# Patient Record
Sex: MALE | Race: WHITE | Employment: OTHER | URBAN - METROPOLITAN AREA
[De-identification: names, ages, dates, MRNs, and addresses within clinical notes are randomized per-mention and may not be internally consistent; named-entity substitution may affect disease eponyms.]

---

## 2019-08-20 NOTE — PROGRESS NOTES
Michele Pearson is a 71 y.o. male here for   Chief Complaint   Patient presents with    New Patient     referred by Dr. Abelino Dexter for DM       Functional glucose monitor and record keeping system? - not often  Eye exam within last year? - due  Foot exam within last year? - due    1. Have you been to the ER, urgent care clinic since your last visit? Hospitalized since your last visit? -n/a    2. Have you seen or consulted any other health care providers outside of the [address] [place] [address] since your last visit?   Include any pap smears or colon screening.-n/a

## 2019-08-21 ENCOUNTER — OFFICE VISIT (OUTPATIENT)
Dept: ENDOCRINOLOGY | Age: 64
End: 2019-08-21

## 2019-08-21 VITALS
HEART RATE: 68 BPM | WEIGHT: 192 LBS | RESPIRATION RATE: 18 BRPM | BODY MASS INDEX: 26.88 KG/M2 | SYSTOLIC BLOOD PRESSURE: 127 MMHG | HEIGHT: 71 IN | DIASTOLIC BLOOD PRESSURE: 73 MMHG | OXYGEN SATURATION: 98 %

## 2019-08-21 DIAGNOSIS — G62.9 POLYNEUROPATHY, UNSPECIFIED: ICD-10-CM

## 2019-08-21 DIAGNOSIS — Z79.4 TYPE 2 DIABETES MELLITUS WITH HYPERGLYCEMIA, WITH LONG-TERM CURRENT USE OF INSULIN (HCC): Primary | ICD-10-CM

## 2019-08-21 DIAGNOSIS — E78.2 MIXED HYPERLIPIDEMIA: ICD-10-CM

## 2019-08-21 DIAGNOSIS — I10 ESSENTIAL HYPERTENSION: ICD-10-CM

## 2019-08-21 DIAGNOSIS — E11.65 TYPE 2 DIABETES MELLITUS WITH HYPERGLYCEMIA, WITH LONG-TERM CURRENT USE OF INSULIN (HCC): Primary | ICD-10-CM

## 2019-08-21 DIAGNOSIS — E11.65 TYPE 2 DIABETES MELLITUS WITH HYPERGLYCEMIA, UNSPECIFIED WHETHER LONG TERM INSULIN USE (HCC): Primary | ICD-10-CM

## 2019-08-21 RX ORDER — SIMVASTATIN 80 MG/1
80 TABLET, FILM COATED ORAL DAILY
COMMUNITY
Start: 2011-04-25

## 2019-08-21 RX ORDER — OXYCODONE HYDROCHLORIDE 5 MG/1
TABLET ORAL AS NEEDED
COMMUNITY
Start: 2019-08-08

## 2019-08-21 RX ORDER — ASPIRIN 325 MG
325 TABLET ORAL DAILY
COMMUNITY

## 2019-08-21 RX ORDER — LISINOPRIL 10 MG/1
5 TABLET ORAL
COMMUNITY

## 2019-08-21 RX ORDER — GLIMEPIRIDE 4 MG/1
4 TABLET ORAL
COMMUNITY
End: 2019-08-21

## 2019-08-21 RX ORDER — METFORMIN HYDROCHLORIDE 1000 MG/1
1000 TABLET ORAL 2 TIMES DAILY WITH MEALS
Qty: 60 TAB | Refills: 11 | Status: SHIPPED | OUTPATIENT
Start: 2019-08-21

## 2019-08-21 RX ORDER — INSULIN GLARGINE 100 [IU]/ML
50 INJECTION, SOLUTION SUBCUTANEOUS DAILY
COMMUNITY
Start: 2019-06-28 | End: 2020-01-29 | Stop reason: ALTCHOICE

## 2019-08-21 RX ORDER — INSULIN ASPART 100 [IU]/ML
8 INJECTION, SOLUTION INTRAVENOUS; SUBCUTANEOUS
COMMUNITY
Start: 2019-07-26 | End: 2019-08-21 | Stop reason: ALTCHOICE

## 2019-08-21 NOTE — LETTER
8/24/19 Patient: Karlie Mojica YOB: 1955 Date of Visit: 8/21/2019 Simba Lakhani MD 
[address] [facility] [address] [place] [address] VIA In Basket Dear Simba Lakhani MD, Thank you for referring Mr. Wing Marmolejo to [address] [address] for evaluation. My notes for this consultation are attached. If you have questions, please do not hesitate to call me. I look forward to following your patient along with you. Sincerely, Abran Welsh MD

## 2019-08-21 NOTE — PATIENT INSTRUCTIONS
Start Metformin 1000 mg twice a day - this will take the place of Jentadueto    Continue Lantus 50 units at night     Start Trulicity weekly , may cause nausea     Stop Novolog , glimepiride       Check sugars twice daily - fist before breakfast and second time at different times ( before dinner , bedtime)

## 2019-08-21 NOTE — PROGRESS NOTES
[facility]               Lynette Molina MD        [address] 78 [phone] Fax [phone]               Patient Information  Date:8/24/2019  Name : Javier Potts 71 y.o.     YOB: 1955         Referred by: Mert Zhou MD         Chief Complaint   Patient presents with   [facility] New Patient     referred by Dr. Lenka Moreno for DM       History of Present Illness: Javier Potts is a 71 y.o. male here for initial visit of  Type 2 Diabetes Mellitus. Type 2 Diabetes was diagnosed 2008 . End organ effects of diabetes: peripheral neuropathy. He is not checking the blood glucose. A1c was 13 in June 2019, has made some changes, blood glucose is still in 200s    Cardiovascular risk factors: dyslipidemia, diabetes mellitus   No hypoglycemia. Very limited activity due to arthritis. Diet is high in carbohydrates. Weight has been fluctuating. No chest pain or shortness of breath, blurred vision  He is on Lantus, NovoLog, glimepiride, Jentadueto. Eats 3 meals a day, on soft drinks        Wt Readings from Last 3 Encounters:   08/21/19 192 lb (87.1 kg)       BP Readings from Last 3 Encounters:   08/21/19 127/73           Past Medical History:   Diagnosis Date    CAD (coronary artery disease) 4/14/2010    Depression 4/14/2010    History of elevated lipids 4/14/2010    HTN (hypertension) 4/14/2010    Nerve damage     R hand    Type 2 diabetes mellitus (HCC)      Current Outpatient Medications   Medication Sig    lisinopril (PRINIVIL, ZESTRIL) 10 mg tablet Take 5 mg by mouth.  oxyCODONE IR (ROXICODONE) 5 mg immediate release tablet as needed.  simvastatin (ZOCOR) 80 mg tablet Take 80 mg by mouth daily.  LANTUS SOLOSTAR U-100 INSULIN 100 unit/mL (3 mL) inpn 50 Units by SubCUTAneous route daily.  aspirin (ASPIRIN) 325 mg tablet Take 325 mg by mouth daily.     dulaglutide (TRULICITY) 7.24 JY/7.4 mL sub-q pen 0.5 mL by SubCUTAneous route every seven (7) days.  metFORMIN (GLUCOPHAGE) 1,000 mg tablet Take 1 Tab by mouth two (2) times daily (with meals). No current facility-administered medications for this visit. No Known Allergies    Review of Systems:  All 10 systems reviewed and are negative other than mentioned in HPI    Physical Examination:   Blood pressure 127/73, pulse 68, resp. rate 18, height 5' 11\" (1.803 m), weight 192 lb (87.1 kg), SpO2 98 %. Estimated body mass index is 26.78 kg/m² as calculated from the following:    Height as of this encounter: 5' 11\" (1.803 m). -   Weight as of this encounter: 192 lb (87.1 kg).   - General: pleasant, no distress, good eye contact  - HEENT: no pallor, no periorbital edema, EOMI  - Neck: supple, no thyromegaly, no nodules  - Cardiovascular: regular,  normal S1 and S2,  - Respiratory: clear to auscultation bilaterally  - Gastrointestinal: soft, nontender, nondistended,  BS +  - Musculoskeletal: no proximal muscle weakness in upper or lower extremities  - Integumentary:,no edema,   - Neurological: alert and oriented  - Psychiatric: normal mood and affect  - Skin: color, texture, turgor normal.     Diabetic foot exam:     Diabetic foot exam:     Left:     Vibratory sensation absent   Filament test decreased sensation with micro filament   Pulse DP: 1+    Deformities: None  Right:    Vibratory sensation absent   Filament test decreased sensation with micro filament   Pulse DP: 1+   Deformities: None      Data Reviewed:       Lab Results   Component Value Date/Time    Glucose 112 (H) 02/15/2009 02:15 PM    Creatinine 1.2 02/15/2009 02:15 PM      Lab Results   Component Value Date/Time    GFR est non-AA >60 02/15/2009 02:15 PM    GFR est AA >60 02/15/2009 02:15 PM    Creatinine 1.2 02/15/2009 02:15 PM    BUN 18 02/15/2009 02:15 PM    Sodium 138 02/15/2009 02:15 PM    Potassium 4.1 02/15/2009 02:15 PM    Chloride 99 02/15/2009 02:15 PM    CO2 25 02/15/2009 02:15 PM       Assessment/Plan: 1. Type 2 diabetes mellitus with hyperglycemia, with long-term current use of insulin ([address]    2. Essential hypertension    3. Mixed hyperlipidemia    4. Polyneuropathy, unspecified        1. Type 2 Diabetes Mellitus with neuropathy  No results found for: HBA1C, HGBE8, UNT1USEE, OAF3ILNJ, HNI3CZQF  Discussed the pathophysiology, no signs of insulin resistance clinically  Stressed the importance of low-carb diet, to increase activity  Has cravings for soft drinks  Trulicity, discontinue Jentadueto, discontinue glimepiride, discontinue NovoLog  Continue Lantus  Metformin  Discussed the benefits and side effects of different medications  Advised to check glucose 2 - 4 times daily    Diabetic issues reviewed : glycemic goals , written exchange diet given, low carbohydrate diet, weight control , home glucose monitoring emphasized,  hypoglycemia management and long term diabetic complications discussed. FLU annually ,Pneumovax ,aspirin daily,annual eye exam,microalbumin    2. HTN : Continue current therapy     3. Hyperlipidemia : Continue statin. 4.peripheral neuropathy: Stressed the importance of good glycemic control  Foot care    Risk for hypoglycemia is high, need close monitoring, importance of checking the blood glucose,        Patient Instructions   Start Metformin 1000 mg twice a day - this will take the place of Jentadueto    Continue Lantus 50 units at night     Start Trulicity weekly , may cause nausea     Stop Novolog , glimepiride       Check sugars twice daily - fist before breakfast and second time at different times ( before dinner , bedtime)         Follow-up and Dispositions    · Return in about 3 months (around 11/21/2019). Thank you for allowing me to participate in the care of this patient.     Denise Chase MD      Patient verbalized understanding     Voice-recognition software was used to generate this report, which may result in some phonetic-based errors in the grammar and contents. Even though attempts were made to correct all the mistakes, some may have been missed and remained in the body of the report.

## 2019-08-24 PROBLEM — G62.9 POLYNEUROPATHY, UNSPECIFIED: Status: ACTIVE | Noted: 2019-08-24

## 2019-08-24 PROBLEM — E78.2 MIXED HYPERLIPIDEMIA: Status: ACTIVE | Noted: 2019-08-24

## 2019-10-15 LAB
CREATININE, EXTERNAL: 1.11
LDL-C, EXTERNAL: 74
MICROALBUMIN UR TEST STR-MCNC: 33.9 MG/DL

## 2020-01-28 ENCOUNTER — OFFICE VISIT (OUTPATIENT)
Dept: ENDOCRINOLOGY | Age: 65
End: 2020-01-28

## 2020-01-28 VITALS
HEART RATE: 77 BPM | BODY MASS INDEX: 26.6 KG/M2 | TEMPERATURE: 96 F | HEIGHT: 71 IN | SYSTOLIC BLOOD PRESSURE: 123 MMHG | RESPIRATION RATE: 16 BRPM | WEIGHT: 190 LBS | OXYGEN SATURATION: 97 % | DIASTOLIC BLOOD PRESSURE: 67 MMHG

## 2020-01-28 DIAGNOSIS — I10 ESSENTIAL HYPERTENSION: ICD-10-CM

## 2020-01-28 DIAGNOSIS — E11.65 TYPE 2 DIABETES MELLITUS WITH HYPERGLYCEMIA, WITH LONG-TERM CURRENT USE OF INSULIN (HCC): Primary | ICD-10-CM

## 2020-01-28 DIAGNOSIS — E78.2 MIXED HYPERLIPIDEMIA: ICD-10-CM

## 2020-01-28 DIAGNOSIS — Z79.4 TYPE 2 DIABETES MELLITUS WITH HYPERGLYCEMIA, WITH LONG-TERM CURRENT USE OF INSULIN (HCC): Primary | ICD-10-CM

## 2020-01-28 RX ORDER — INSULIN ASPART 100 [IU]/ML
INJECTION, SOLUTION INTRAVENOUS; SUBCUTANEOUS
COMMUNITY

## 2020-01-28 NOTE — PROGRESS NOTES
Carly Lira MD      Patient Information  Date:1/28/2020  Name : Sheila Leroy 71 y.o.     YOB: 1955         Referred by: Rosendo Munoz MD         Chief Complaint   Patient presents with    Diabetes       History of Present Illness: Sheila Leroy is a 71 y.o. male here for follow-up of  Type 2 Diabetes Mellitus. This is a second visit  Type 2 Diabetes was diagnosed 2008 . End organ effects of diabetes: peripheral neuropathy. No change in the A1c it is still 13, not checking the blood glucose  Initially reported to be taking  insulin consistently later on wife admitted due to the expense of insulin he is taking as needed, self decrease the dose  Diet is high in carbohydrates  Weight has been fluctuating  Trulicity helped but was expensive    Eats 3 meals a day, on soft drinks        Wt Readings from Last 3 Encounters:   01/28/20 190 lb (86.2 kg)   08/21/19 192 lb (87.1 kg)       BP Readings from Last 3 Encounters:   01/28/20 123/67   08/21/19 127/73           Past Medical History:   Diagnosis Date    CAD (coronary artery disease) 4/14/2010    Depression 4/14/2010    History of elevated lipids 4/14/2010    HTN (hypertension) 4/14/2010    Nerve damage     R hand    Type 2 diabetes mellitus (HCC)      Current Outpatient Medications   Medication Sig    insulin aspart U-100 (NOVOLOG FLEXPEN U-100 INSULIN) 100 unit/mL (3 mL) inpn by SubCUTAneous route daily (after dinner).  lisinopril (PRINIVIL, ZESTRIL) 10 mg tablet Take 5 mg by mouth.  oxyCODONE IR (ROXICODONE) 5 mg immediate release tablet as needed.  simvastatin (ZOCOR) 80 mg tablet Take 80 mg by mouth daily.  LANTUS SOLOSTAR U-100 INSULIN 100 unit/mL (3 mL) inpn 50 Units by SubCUTAneous route daily.  aspirin (ASPIRIN) 325 mg tablet Take 325 mg by mouth daily.  metFORMIN (GLUCOPHAGE) 1,000 mg tablet Take 1 Tab by mouth two (2) times daily (with meals).     dulaglutide (TRULICITY) 2.90 WO/0.7 mL sub-q pen 0.5 mL by SubCUTAneous route every seven (7) days. No current facility-administered medications for this visit. No Known Allergies    Review of Systems:  All 10 systems reviewed and are negative other than mentioned in HPI    Physical Examination:   Blood pressure 123/67, pulse 77, temperature 96 °F (35.6 °C), temperature source Oral, resp. rate 16, height 5' 11\" (1.803 m), weight 190 lb (86.2 kg), SpO2 97 %. Estimated body mass index is 26.5 kg/m² as calculated from the following:    Height as of this encounter: 5' 11\" (1.803 m). -   Weight as of this encounter: 190 lb (86.2 kg).   - General: pleasant, no distress, good eye contact  - HEENT: no pallor, no periorbital edema, EOMI  - Neck: supple  - Cardiovascular: regular,  normal S1 and S2,  - Respiratory: clear to auscultation bilaterally  - Gastrointestinal: soft, nontender, nondistended,  BS +  - Musculoskeletal: no proximal muscle weakness in upper or lower extremities  - Integumentary:,no edema,   - Neurological: alert and oriented  - Psychiatric: normal mood and affect  - Skin: color, texture, turgor normal.     Diabetic foot exam:     Diabetic foot exam:     Left:     Vibratory sensation absent   Filament test decreased sensation with micro filament   Pulse DP: 1+    Deformities: None  Right:    Vibratory sensation absent   Filament test decreased sensation with micro filament   Pulse DP: 1+   Deformities: None      Data Reviewed:       Lab Results   Component Value Date/Time    Glucose 112 (H) 02/15/2009 02:15 PM    Creatinine 1.2 02/15/2009 02:15 PM      Lab Results   Component Value Date/Time    GFR est non-AA >60 02/15/2009 02:15 PM    GFR est AA >60 02/15/2009 02:15 PM    Creatinine 1.2 02/15/2009 02:15 PM    BUN 18 02/15/2009 02:15 PM    Sodium 138 02/15/2009 02:15 PM    Potassium 4.1 02/15/2009 02:15 PM    Chloride 99 02/15/2009 02:15 PM    CO2 25 02/15/2009 02:15 PM       Assessment/Plan: 1. Type 2 diabetes mellitus with hyperglycemia, with long-term current use of insulin ([address]    2. Mixed hyperlipidemia        1. Type 2 Diabetes Mellitus with neuropathy  No results found for: HBA1C, HGBE8, HIZ9HBTJ, ZDC8LQMJ, KQG2NHUZ    Stressed the importance of low-carb diet, to increase activity  He still eating ice cream every night and drinking soft drinks  NPH 30 units in the morning, 20 units at bedtime  Continue metformin  Log in 2 weeks  Cut down carbohydrates    Diabetic issues reviewed : glycemic goals , written exchange diet given, low carbohydrate diet, weight control , home glucose monitoring emphasized,  hypoglycemia management and long term diabetic complications discussed. FLU annually ,Pneumovax ,aspirin daily,annual eye exam,microalbumin    2. HTN : Continue current therapy     3. Hyperlipidemia : Continue statin. 4.peripheral neuropathy: Stressed the importance of good glycemic control  Foot care          There are no Patient Instructions on file for this visit. Thank you for allowing me to participate in the care of this patient. Arline Byrd MD      Patient verbalized understanding     Voice-recognition software was used to generate this report, which may result in some phonetic-based errors in the grammar and contents. Even though attempts were made to correct all the mistakes, some may have been missed and remained in the body of the report.

## 2020-01-28 NOTE — LETTER
1/30/20 Patient: Huey Merritt YOB: 1955 Date of Visit: 1/28/2020 Joselito Gomez MD 
[address] [address] VIA Facsimile: [phone] Dear Joselito Gomez MD, Thank you for referring Mr. Abhay Chu to [address] [place] [address] for evaluation. My notes for this consultation are attached. If you have questions, please do not hesitate to call me. I look forward to following your patient along with you. Sincerely, Marj Kennedy MD

## 2020-01-28 NOTE — PROGRESS NOTES
Ranjit Marroquin is a 71 y.o. male here for   Chief Complaint   Patient presents with    Diabetes       Functional glucose monitor and record keeping system? -yes   Eye exam within last year? -a month ago Dr. Reilly Carlisle exam within last year? - on file    1. Have you been to the ER, urgent care clinic since your last visit? Hospitalized since your last visit? -no    2. Have you seen or consulted any other health care providers outside of the [address] [place] [address] since your last visit? Include any pap smears or colon screening. -PCP

## 2020-01-28 NOTE — PATIENT INSTRUCTIONS
Metformin 1000 mg twice a day - Novolin N 30 units in AM and 20 units at bedtime We will decide about Novolog after 2 weeks depending on the sugar reading you send Check sugars twice daily - fist before breakfast and second time at different times ( before dinner , bedtime)

## 2020-01-29 RX ORDER — SYRINGE AND NEEDLE,INSULIN,1ML 31GX15/64"
1 SYRINGE, EMPTY DISPOSABLE MISCELLANEOUS 2 TIMES DAILY
Qty: 200 PEN NEEDLE | Refills: 3 | Status: SHIPPED | OUTPATIENT
Start: 2020-01-29 | End: 2021-02-24

## 2020-10-28 ENCOUNTER — OFFICE VISIT (OUTPATIENT)
Dept: ENDOCRINOLOGY | Age: 65
End: 2020-10-28
Payer: COMMERCIAL

## 2020-10-28 VITALS
WEIGHT: 199 LBS | HEART RATE: 75 BPM | OXYGEN SATURATION: 97 % | HEIGHT: 71 IN | RESPIRATION RATE: 22 BRPM | TEMPERATURE: 95.4 F | SYSTOLIC BLOOD PRESSURE: 133 MMHG | BODY MASS INDEX: 27.86 KG/M2 | DIASTOLIC BLOOD PRESSURE: 91 MMHG

## 2020-10-28 DIAGNOSIS — Z79.4 TYPE 2 DIABETES MELLITUS WITH HYPERGLYCEMIA, WITH LONG-TERM CURRENT USE OF INSULIN (HCC): Primary | ICD-10-CM

## 2020-10-28 DIAGNOSIS — E11.65 TYPE 2 DIABETES MELLITUS WITH HYPERGLYCEMIA, WITH LONG-TERM CURRENT USE OF INSULIN (HCC): Primary | ICD-10-CM

## 2020-10-28 DIAGNOSIS — G62.9 POLYNEUROPATHY, UNSPECIFIED: ICD-10-CM

## 2020-10-28 DIAGNOSIS — E11.65 TYPE 2 DIABETES MELLITUS WITH HYPERGLYCEMIA, WITH LONG-TERM CURRENT USE OF INSULIN (HCC): ICD-10-CM

## 2020-10-28 DIAGNOSIS — Z79.4 TYPE 2 DIABETES MELLITUS WITH HYPERGLYCEMIA, WITH LONG-TERM CURRENT USE OF INSULIN (HCC): ICD-10-CM

## 2020-10-28 DIAGNOSIS — E78.2 MIXED HYPERLIPIDEMIA: ICD-10-CM

## 2020-10-28 DIAGNOSIS — I10 ESSENTIAL HYPERTENSION: ICD-10-CM

## 2020-10-28 PROCEDURE — 83036 HEMOGLOBIN GLYCOSYLATED A1C: CPT | Performed by: INTERNAL MEDICINE

## 2020-10-28 PROCEDURE — 99215 OFFICE O/P EST HI 40 MIN: CPT | Performed by: INTERNAL MEDICINE

## 2020-10-28 NOTE — PROGRESS NOTES
Honorio Sotelo is a 76 y.o. male here for   Chief Complaint   Patient presents with    Diabetes       1. Have you been to the ER, urgent care clinic since your last visit? Hospitalized since your last visit? -no    2. Have you seen or consulted any other health care providers outside of the [address] [place] [address] since your last visit? Include any pap smears or colon screening. -PCP

## 2020-10-28 NOTE — PROGRESS NOTES
Dasha Andres MD      Patient Information  Date:10/28/2020  Name : Nimo Sher 76 y.o.     YOB: 1955         Referred by: Micki Montalvo MD         Chief Complaint   Patient presents with    Diabetes       History of Present Illness: Nimo Sher is a 76 y.o. male here for follow-up of  Type 2 Diabetes Mellitus. He was seen once in 2019, then beginning of 2020, this is his third visit  Type 2 Diabetes was diagnosed 2008 . End organ effects of diabetes: peripheral neuropathy. Last A1c elevated  Did not bring the meter  In the past due to the expense of the insulin he was taking insulin as needed  He was on Novolin N 30 units in the morning, 20 units p.m last visit  PCP increased it to 35 units in the morning and 25 units p.m.   Reports that the blood glucose has improved and they are less than 130 in the morning, evening numbers are also better per recall  No log to confirm  However A1c is still high  Limited activity due to cramping in his legs  Chronic smoker  No arthritis  Diet is high in carbohydrates    Trulicity helped but was expensive    Eats 3 meals a day, on soft drinks        Wt Readings from Last 3 Encounters:   10/28/20 199 lb (90.3 kg)   01/28/20 190 lb (86.2 kg)   08/21/19 192 lb (87.1 kg)       BP Readings from Last 3 Encounters:   10/28/20 (!) 133/91   01/28/20 123/67   08/21/19 127/73           Past Medical History:   Diagnosis Date    CAD (coronary artery disease) 4/14/2010    Depression 4/14/2010    History of elevated lipids 4/14/2010    HTN (hypertension) 4/14/2010    Nerve damage     R hand    Type 2 diabetes mellitus (HCC)      Current Outpatient Medications   Medication Sig    insulin NPH (NOVOLIN N NPH U-100 INSULIN) 100 unit/mL injection Inject 30 units in the AM and 20 units at bedtime (Patient taking differently: Inject 30 units in the AM and 20 units at bedtime  325 in the am and 250 in the evening?)    insulin syringe-needle U-100 1 mL 31 gauge x 15/64\" syrg 1 Syringe by Does Not Apply route two (2) times a day. Dx Code: E11.65    insulin aspart U-100 (NOVOLOG FLEXPEN U-100 INSULIN) 100 unit/mL (3 mL) inpn by SubCUTAneous route daily (after dinner).  lisinopril (PRINIVIL, ZESTRIL) 10 mg tablet Take 5 mg by mouth.  oxyCODONE IR (ROXICODONE) 5 mg immediate release tablet as needed.  simvastatin (ZOCOR) 80 mg tablet Take 80 mg by mouth daily.  aspirin (ASPIRIN) 325 mg tablet Take 325 mg by mouth daily.  metFORMIN (GLUCOPHAGE) 1,000 mg tablet Take 1 Tab by mouth two (2) times daily (with meals). No current facility-administered medications for this visit. No Known Allergies    Review of Systems:  All 10 systems reviewed and are negative other than mentioned in HPI    Physical Examination:   Blood pressure (!) 133/91, pulse 75, temperature (!) 95.4 °F (35.2 °C), temperature source Oral, resp. rate 22, height 5' 11\" (1.803 m), weight 199 lb (90.3 kg), SpO2 97 %. Estimated body mass index is 27.75 kg/m² as calculated from the following:    Height as of this encounter: 5' 11\" (1.803 m). -   Weight as of this encounter: 199 lb (90.3 kg).   - General: pleasant, no distress, good eye contact  - HEENT: no pallor, no periorbital edema, EOMI  - Neck: supple  - Cardiovascular: regular,  normal S1 and S2,  - Respiratory: clear to auscultation bilaterally  - Gastrointestinal: soft, nontender, nondistended,  BS +  - Musculoskeletal: no proximal muscle weakness in upper or lower extremities  - Integumentary:,no edema,   - Neurological: alert and oriented  - Psychiatric: normal mood and affect  - Skin: color, texture, turgor normal.           Data Reviewed:       Lab Results   Component Value Date/Time    Glucose 112 (H) 02/15/2009 02:15 PM    Creatinine 1.2 02/15/2009 02:15 PM      Lab Results   Component Value Date/Time    GFR est non-AA >60 02/15/2009 02:15 PM    GFR est AA >60 02/15/2009 02:15 PM    Creatinine 1.2 02/15/2009 02:15 PM    BUN 18 02/15/2009 02:15 PM    Sodium 138 02/15/2009 02:15 PM    Potassium 4.1 02/15/2009 02:15 PM    Chloride 99 02/15/2009 02:15 PM    CO2 25 02/15/2009 02:15 PM       Assessment/Plan:     1. Type 2 diabetes mellitus with hyperglycemia, with long-term current use of insulin ([address]    2. Essential hypertension    3. Mixed hyperlipidemia        1. Type 2 Diabetes Mellitus with neuropathy  No results found for: HBA1C, HGBE8, PPJ7MKDQ, ZGO9BGZW, KWK7XSLN  Poorly controlled diabetes mellitus  A1c October 2020 is 10   Stressed importance of bringing the log to every visit  Long-term complications discussed    NPH 35 units in the morning, 25 units at bedtime  Continue metformin  Mixed meal      2. HTN : Continue current therapy     3. Hyperlipidemia : Continue statin. 4.peripheral neuropathy: Stressed the importance of good glycemic control  Foot care      5. Claudication: Possible PAD  Discussed about possible etiology, diabetes mellitus plus smoking, smoking because of small vessel disease and difficult to revascularize. Discussed about ordering LINETTE to see if there are any blockages in major vessels which can be revascularized, patient declined        There are no Patient Instructions on file for this visit. Thank you for allowing me to participate in the care of this patient. Alyce Zamarripa MD      Patient verbalized understanding     Voice-recognition software was used to generate this report, which may result in some phonetic-based errors in the grammar and contents. Even though attempts were made to correct all the mistakes, some may have been missed and remained in the body of the report.

## 2020-10-28 NOTE — LETTER
10/28/20 Patient: Gopi Light YOB: 1955 Date of Visit: 10/28/2020 Vandana Larsen MD 
[address] [address] VIA Facsimile: [phone] Dear Vandana Larsen MD, Thank you for referring Mr. Shad Walters to [address] [place] [address] for evaluation. My notes for this consultation are attached. If you have questions, please do not hesitate to call me. I look forward to following your patient along with you. Sincerely, Nigel Trimble MD

## 2020-10-29 LAB — HBA1C MFR BLD HPLC: 10 %

## 2021-02-15 DIAGNOSIS — Z79.4 TYPE 2 DIABETES MELLITUS WITH HYPERGLYCEMIA, WITH LONG-TERM CURRENT USE OF INSULIN (HCC): Primary | ICD-10-CM

## 2021-02-15 DIAGNOSIS — E11.65 TYPE 2 DIABETES MELLITUS WITH HYPERGLYCEMIA, WITH LONG-TERM CURRENT USE OF INSULIN (HCC): Primary | ICD-10-CM

## 2021-02-24 DIAGNOSIS — Z79.4 TYPE 2 DIABETES MELLITUS WITH HYPERGLYCEMIA, WITH LONG-TERM CURRENT USE OF INSULIN (HCC): ICD-10-CM

## 2021-02-24 DIAGNOSIS — E11.65 TYPE 2 DIABETES MELLITUS WITH HYPERGLYCEMIA, WITH LONG-TERM CURRENT USE OF INSULIN (HCC): ICD-10-CM

## 2021-02-24 RX ORDER — SYRINGE AND NEEDLE,INSULIN,1ML 31GX15/64"
SYRINGE, EMPTY DISPOSABLE MISCELLANEOUS
Qty: 100 PEN NEEDLE | Refills: 5 | Status: SHIPPED | OUTPATIENT
Start: 2021-02-24 | End: 2022-01-04 | Stop reason: SDUPTHER

## 2021-11-01 DIAGNOSIS — Z79.4 TYPE 2 DIABETES MELLITUS WITH HYPERGLYCEMIA, WITH LONG-TERM CURRENT USE OF INSULIN (HCC): ICD-10-CM

## 2021-11-01 DIAGNOSIS — E11.65 TYPE 2 DIABETES MELLITUS WITH HYPERGLYCEMIA, WITH LONG-TERM CURRENT USE OF INSULIN (HCC): ICD-10-CM

## 2021-12-01 DIAGNOSIS — E11.65 TYPE 2 DIABETES MELLITUS WITH HYPERGLYCEMIA, WITH LONG-TERM CURRENT USE OF INSULIN (HCC): ICD-10-CM

## 2021-12-01 DIAGNOSIS — Z79.4 TYPE 2 DIABETES MELLITUS WITH HYPERGLYCEMIA, WITH LONG-TERM CURRENT USE OF INSULIN (HCC): ICD-10-CM

## 2022-01-04 DIAGNOSIS — Z79.4 TYPE 2 DIABETES MELLITUS WITH HYPERGLYCEMIA, WITH LONG-TERM CURRENT USE OF INSULIN (HCC): ICD-10-CM

## 2022-01-04 DIAGNOSIS — E11.65 TYPE 2 DIABETES MELLITUS WITH HYPERGLYCEMIA, WITH LONG-TERM CURRENT USE OF INSULIN (HCC): ICD-10-CM

## 2022-01-04 RX ORDER — SYRINGE AND NEEDLE,INSULIN,1ML 31GX15/64"
SYRINGE, EMPTY DISPOSABLE MISCELLANEOUS
Qty: 100 PEN NEEDLE | Refills: 5 | Status: SHIPPED | OUTPATIENT
Start: 2022-01-04

## 2022-01-04 RX ORDER — SYRINGE AND NEEDLE,INSULIN,1ML 31GX15/64"
SYRINGE, EMPTY DISPOSABLE MISCELLANEOUS
Refills: 0 | OUTPATIENT
Start: 2022-01-04

## 2022-03-19 PROBLEM — E78.2 MIXED HYPERLIPIDEMIA: Status: ACTIVE | Noted: 2019-08-24

## 2022-03-20 PROBLEM — G62.9 POLYNEUROPATHY, UNSPECIFIED: Status: ACTIVE | Noted: 2019-08-24
